# Patient Record
Sex: FEMALE | Race: WHITE | Employment: OTHER | URBAN - METROPOLITAN AREA
[De-identification: names, ages, dates, MRNs, and addresses within clinical notes are randomized per-mention and may not be internally consistent; named-entity substitution may affect disease eponyms.]

---

## 2016-11-15 LAB
CREAT SERPL-MCNC: 0.78 MG/DL (ref 0.55–1.02)
GFR SERPL CREATININE-BSD FRML MDRD: >60 ML/MIN
GLUCOSE SERPL-MCNC: 169 MG/DL (ref 74–106)
POTASSIUM SERPL-SCNC: 4.3 MMOL/L (ref 3.5–5.1)
TSH SERPL-ACNC: 1.64 UIU/ML (ref 0.36–3.74)

## 2017-03-09 LAB
CHOLEST SERPL-MCNC: 157 MG/DL
HDLC SERPL-MCNC: 44 MG/DL
LDLC SERPL CALC-MCNC: 36 MG/DL
TRIGL SERPL-MCNC: 384 MG/DL

## 2017-06-20 LAB
CREAT SERPL-MCNC: 0.69 MG/DL (ref 0.55–1.02)
GFR SERPL CREATININE-BSD FRML MDRD: >60 ML/MIN
GLUCOSE SERPL-MCNC: 169 MG/DL (ref 74–106)
POTASSIUM SERPL-SCNC: 4.1 MMOL/L (ref 3.5–5.1)
TSH SERPL-ACNC: 0.84 UIU/ML (ref 0.36–3.74)

## 2017-07-14 LAB — GLUCOSE SERPL-MCNC: 135 MG/DL (ref 60–100)

## 2017-10-12 LAB — GLUCOSE SERPL-MCNC: 193 MG/DL (ref 74–106)

## 2017-12-14 LAB — GLUCOSE SERPL-MCNC: 247 MG/DL (ref 60–100)

## 2018-03-21 ENCOUNTER — TRANSFERRED RECORDS (OUTPATIENT)
Dept: HEALTH INFORMATION MANAGEMENT | Facility: CLINIC | Age: 73
End: 2018-03-21

## 2018-03-21 LAB — HBA1C MFR BLD: 10.3 % (ref 4.5–6.2)

## 2018-03-22 LAB — TSH SERPL-ACNC: 0.91 UIU/ML (ref 0.36–3.74)

## 2018-03-23 LAB
CREAT SERPL-MCNC: 0.75 MG/DL (ref 0.55–1.02)
GFR SERPL CREATININE-BSD FRML MDRD: >60 ML/MIN
GLUCOSE SERPL-MCNC: 278 MG/DL (ref 70–110)
POTASSIUM SERPL-SCNC: 3.9 MMOL/L (ref 3.5–5.1)

## 2018-06-12 LAB
CHOLEST SERPL-MCNC: 152 MG/DL
HBA1C MFR BLD: 9.2 % (ref 0–5.7)
HDLC SERPL-MCNC: 39 MG/DL
LDLC SERPL CALC-MCNC: 89 MG/DL
TRIGL SERPL-MCNC: 419 MG/DL

## 2018-08-28 LAB — HBA1C MFR BLD: 9 % (ref 0–5.7)

## 2018-12-20 ENCOUNTER — TRANSFERRED RECORDS (OUTPATIENT)
Dept: HEALTH INFORMATION MANAGEMENT | Facility: CLINIC | Age: 73
End: 2018-12-20

## 2018-12-21 ENCOUNTER — TRANSFERRED RECORDS (OUTPATIENT)
Dept: HEALTH INFORMATION MANAGEMENT | Facility: CLINIC | Age: 73
End: 2018-12-21

## 2018-12-21 LAB
CREAT SERPL-MCNC: 0.79 MG/DL (ref 0.55–1.02)
GFR SERPL CREATININE-BSD FRML MDRD: >60 ML/MIN
GLUCOSE SERPL-MCNC: 164 MG/DL (ref 74–106)
HBA1C MFR BLD: 8.4 % (ref 0–5.7)
POTASSIUM SERPL-SCNC: 4.4 MMOL/L (ref 3.5–5.1)
TSH SERPL-ACNC: 1.69 UIU/ML (ref 0.36–3.74)

## 2019-01-11 ENCOUNTER — TRANSFERRED RECORDS (OUTPATIENT)
Dept: HEALTH INFORMATION MANAGEMENT | Facility: CLINIC | Age: 74
End: 2019-01-11

## 2019-01-14 ENCOUNTER — PRE VISIT (OUTPATIENT)
Dept: ENDOCRINOLOGY | Facility: CLINIC | Age: 74
End: 2019-01-14

## 2019-01-14 NOTE — TELEPHONE ENCOUNTER
Left message for pt to call back to clinic to go over Pre-Visit questions for upcoming appointment on 1/28/19 with Dr Sue.   Yamel Mcclendon, CMA

## 2019-01-22 ENCOUNTER — TELEPHONE (OUTPATIENT)
Dept: ENDOCRINOLOGY | Facility: CLINIC | Age: 74
End: 2019-01-22

## 2019-01-22 NOTE — TELEPHONE ENCOUNTER
PREVISIT INFORMATION                                                    Nida Deng scheduled for future visit at [facility] specialty clinics.    Patient is scheduled to see Dr Sue on 1/28/19  Reason for visit: thyroid/diabetes  Referring provider   Has patient seen previous specialist? No  Medical Records:  Pt is having [facility] fax records    REVIEW                                                      New patient packet mailed to patient: No  Medication reconciliation complete: No      No current outpatient medications on file.       Allergies: Patient has no allergy information on record.        PLAN/FOLLOW-UP NEEDED                                                      Previsit review complete.  Patient will see provider at future scheduled appointment.     Patient Reminders Given:  Please, make sure you bring an updated list of your medications.   If you are having a procedure, please, present 15 minutes early.  If you need to cancel or reschedule,please call [phone].    Yamel Mcclendon

## 2019-01-22 NOTE — TELEPHONE ENCOUNTER
[facility] Call Center    Phone Message    May a detailed message be left on voicemail: yes    Reason for Call: Other: Pt called in stating she received a voicemail from the clinic stating they needed her medical records. Writer informed pt that medical records have been received per appt notes. Pt wanted to make sure the clinic has received everything they need. Please call pt if anymore info is needed.     Action Taken: Message routed to:  Adult Clinics: Endocrinology p [id number]

## 2019-02-16 ENCOUNTER — HOSPITAL ENCOUNTER (EMERGENCY)
Dept: HOSPITAL 11 - JP.ED | Age: 74
Discharge: HOME | End: 2019-02-16
Payer: MEDICARE

## 2019-02-16 DIAGNOSIS — J32.9: Primary | ICD-10-CM

## 2019-02-16 DIAGNOSIS — E11.9: ICD-10-CM

## 2019-02-16 DIAGNOSIS — I10: ICD-10-CM

## 2019-02-16 RX ADMIN — BUDESONIDE ONE MG: 0.5 SUSPENSION RESPIRATORY (INHALATION) at 14:58

## 2019-02-19 ENCOUNTER — OFFICE VISIT (OUTPATIENT)
Dept: OTOLARYNGOLOGY | Facility: OTHER | Age: 74
End: 2019-02-19
Attending: OTOLARYNGOLOGY
Payer: MEDICARE

## 2019-02-19 DIAGNOSIS — J32.3 CHRONIC SPHENOIDAL SINUSITIS: Primary | ICD-10-CM

## 2019-02-19 PROCEDURE — G0463 HOSPITAL OUTPT CLINIC VISIT: HCPCS

## 2019-02-21 NOTE — PROGRESS NOTES
ROB DUKESY    73 Y old Female, : 1945    Account Number: [id number]    [address] [place] [id number]    Home: [phone]     Guarantor: ROB DUKES Insurance: [facility] MEDICARE B Payer ID: [id number]      Appointment Facility: [facility]      2019 Progress Notes: Theron Morris MD       Reason for Appointment   1. SINUS/SCANS AT [facility]/PATIENT IS CALLING TO HAVE THEM SENT   2. Vertex headache   History of Present Illness   HPI:   The patient is a 73-year-old female who comes to the office today with a primary complaint of splitting headache at the vertex of her head. This is been present 2 variable degrees for several months. She had CT scans obtained at [facility] of her head and then specifically of her sinuses last  and then again in January that demonstrate bilateral sphenoid sinusitis with clear remaining sinuses. This is the impression from the report. The films are not available for my review. She has been on multiple antibiotic courses. She has been on a nasal steroid spray and nasal saline irrigations. She continues to have significant lingering symptoms.   Examination   General Examination:  Nasal-her septum is midline. There is no polyps or purulence visible today. She does have swelling of her nasal membranes.   Neck-no masses or adenopathy   Oral cavity oropharynx-free of lesions or inflammation   Head neck integument-Clear   General-the patient appears well and in no distress   Neuro-there are no focal cranial nerve deficits.     Assessments   1. Chronic sphenoidal sinusitis - J32.3 (Primary)   Treatment   1. Others   Notes: The patient and her granddaughter were counseled that isolated sphenoid sinusitis should be addressed surgically. We will make arrangements for endoscopic drainage at their earliest convenience. We will obtain her CT scans from the [facility].  Procedures  [ ].                      Electronically signed by  THERON MORRIS MD on 2019 at 07:53 AM CST   Sign off status: Completed          [facility]  [address] [place] [address]  Tel: [phone]  Fax:           Patient: ROB DUKES : 1945 Progress Note: Theron Morris MD 2019      Note generated by [employer] EMR/PM Software ([email or web address])

## 2019-05-16 ENCOUNTER — HOSPITAL ENCOUNTER (OUTPATIENT)
Dept: HOSPITAL 11 - JP.MRI | Age: 74
Discharge: HOME | End: 2019-05-16
Attending: FAMILY MEDICINE
Payer: MEDICARE

## 2019-05-16 DIAGNOSIS — R42: Primary | ICD-10-CM

## 2019-05-16 DIAGNOSIS — J32.0: ICD-10-CM

## 2019-05-16 PROCEDURE — A9576 INJ PROHANCE MULTIPACK: HCPCS

## 2019-05-16 PROCEDURE — A9579 GAD-BASE MR CONTRAST NOS,1ML: HCPCS

## 2019-05-16 PROCEDURE — 70553 MRI BRAIN STEM W/O & W/DYE: CPT

## 2019-05-16 RX ADMIN — GADOTERIDOL SCH ML: 279.3 INJECTION, SOLUTION INTRAVENOUS at 14:42

## 2019-08-01 ENCOUNTER — TELEPHONE (OUTPATIENT)
Dept: ENDOCRINOLOGY | Facility: CLINIC | Age: 74
End: 2019-08-01

## 2019-08-01 NOTE — TELEPHONE ENCOUNTER
[facility] Call Center    Phone Message    May a detailed message be left on voicemail: yes    Reason for Call: Other: Pt is requesting a call back from care team to discuss a timeline of appts. She missed her previously scheduled appt.     She states she is traveling from about 3 hours away and is requesting a call back to discuss how many times she'll have to come for appts. Writer advised her since she has not been seen it would be hard to answer. Please advise.     Action Taken: Message routed to:  Adult Clinics: Endocrinology p [id number]

## 2019-08-01 NOTE — TELEPHONE ENCOUNTER
Patient would like to be placed on list to see Dr. Christal Jin when schedule is available for Hashimotos consult. She will schedule at the [facility] to establish care with a primary doctor.    Qian Alexander LPN  Diabetes Clinic Coordinator   Adult Endocrinology and Pediatric Specialty Clinics  [facility]

## 2019-08-01 NOTE — TELEPHONE ENCOUNTER
Appears patient was scheduled to see Endocrine back in January for thyroid/diabetes consult.     Attempted to contact patient. Unable to leave voicemail as call was disconnected.    Qian Alexander LPN  Diabetes Clinic Coordinator   Adult Endocrinology and Pediatric Specialty Clinics  [facility]

## 2019-08-22 ENCOUNTER — TELEPHONE (OUTPATIENT)
Dept: ENDOCRINOLOGY | Facility: CLINIC | Age: 74
End: 2019-08-22

## 2019-08-22 NOTE — TELEPHONE ENCOUNTER
Patient informed Dr. Jin's schedule is still not available. Reassured patient that she is on wait list and will be contacted as soon as schedule opens.    Qian Alexander LPN  Diabetes Clinic Coordinator   Adult Endocrinology and Pediatric Specialty Clinics  [facility]

## 2019-08-22 NOTE — TELEPHONE ENCOUNTER
[facility] Call Center    Phone Message    May a detailed message be left on voicemail: no    Reason for Call: Other: Pt had appt in Feb with Dr Sue but missed the appt.  Qian in endo talked to pt and offered new endo physician that was starting in Sept 2019 and she was going to call when schedule available.  Pt is just checking in and would like to be put on that schedule asap.  Please advise.       Pt does not have good service where she is and is asking to leave a detailed message if possible.     Action Taken: Message routed to:  Adult Clinics: Endocrinology p [id number]

## 2019-09-17 NOTE — TELEPHONE ENCOUNTER
Patient scheduled for 9/23/19 with Dr. Parker.    Renetta Thompson, [facility]  Adult Endocrinology  [facility]

## 2019-09-23 ENCOUNTER — OFFICE VISIT (OUTPATIENT)
Dept: ENDOCRINOLOGY | Facility: CLINIC | Age: 74
End: 2019-09-23
Payer: MEDICARE

## 2019-09-23 VITALS
WEIGHT: 161.6 LBS | OXYGEN SATURATION: 92 % | HEART RATE: 97 BPM | DIASTOLIC BLOOD PRESSURE: 85 MMHG | SYSTOLIC BLOOD PRESSURE: 130 MMHG

## 2019-09-23 DIAGNOSIS — E06.3 HASHIMOTO'S THYROIDITIS: Primary | ICD-10-CM

## 2019-09-23 DIAGNOSIS — E11.65 TYPE 2 DIABETES MELLITUS WITH HYPERGLYCEMIA, UNSPECIFIED WHETHER LONG TERM INSULIN USE (H): ICD-10-CM

## 2019-09-23 LAB — HBA1C MFR BLD: 9.4 % (ref 0–5.6)

## 2019-09-23 PROCEDURE — 99203 OFFICE O/P NEW LOW 30 MIN: CPT | Performed by: INTERNAL MEDICINE

## 2019-09-23 PROCEDURE — 36415 COLL VENOUS BLD VENIPUNCTURE: CPT | Performed by: INTERNAL MEDICINE

## 2019-09-23 PROCEDURE — 83036 HEMOGLOBIN GLYCOSYLATED A1C: CPT | Performed by: INTERNAL MEDICINE

## 2019-09-23 RX ORDER — LISINOPRIL 10 MG/1
10 TABLET ORAL DAILY
COMMUNITY
Start: 2016-12-02

## 2019-09-23 RX ORDER — LEVOTHYROXINE SODIUM 112 UG/1
1 CAPSULE ORAL DAILY
COMMUNITY
End: 2019-10-03 | Stop reason: DRUGHIGH

## 2019-09-23 RX ORDER — ATORVASTATIN CALCIUM 40 MG/1
1 TABLET, FILM COATED ORAL DAILY
COMMUNITY
Start: 2018-11-27

## 2019-09-23 RX ORDER — FLURBIPROFEN SODIUM 0.3 MG/ML
1 SOLUTION/ DROPS OPHTHALMIC DAILY
Refills: 11 | COMMUNITY
Start: 2019-07-08

## 2019-09-23 RX ORDER — METOPROLOL TARTRATE 25 MG/1
12.5 TABLET, FILM COATED ORAL 2 TIMES DAILY
COMMUNITY
Start: 2018-06-28

## 2019-09-23 RX ORDER — INSULIN GLARGINE 100 [IU]/ML
6 INJECTION, SOLUTION SUBCUTANEOUS DAILY
Refills: 3 | COMMUNITY
Start: 2019-08-06

## 2019-09-23 RX ORDER — BUDESONIDE 1 MG/2ML
1 INHALANT ORAL DAILY
Refills: 3 | COMMUNITY
Start: 2019-09-18

## 2019-09-23 RX ORDER — TRAZODONE HYDROCHLORIDE 150 MG/1
50 TABLET ORAL DAILY
Refills: 3 | COMMUNITY
Start: 2019-06-25

## 2019-09-23 RX ORDER — LEVALBUTEROL TARTRATE 45 UG/1
1-2 AEROSOL, METERED ORAL EVERY 4 HOURS PRN
COMMUNITY
Start: 2016-12-02

## 2019-09-23 SDOH — HEALTH STABILITY: MENTAL HEALTH: HOW OFTEN DO YOU HAVE A DRINK CONTAINING ALCOHOL?: NEVER

## 2019-09-23 NOTE — NURSING NOTE
Nida Deng's goals for this visit include:   Chief Complaint   Patient presents with     Consult     Thyroid Problem     Diabetes     She requests these members of her care team be copied on today's visit information: No    PCP: No Ref-Primary, Physician    Referring Provider:  No referring provider defined for this encounter.    /85 (BP Location: Left arm, Patient Position: Sitting, Cuff Size: Adult Regular)   Pulse 97   Wt 73.3 kg (161 lb 9.6 oz)   SpO2 92%     Do you need any medication refills at today's visit? No

## 2019-09-23 NOTE — LETTER
Date:September 26, 2019      Patient was self referred, no letter generated. Do not send.        [facility] Health Information

## 2019-09-23 NOTE — PROGRESS NOTES
Endocrinology Note         Nida is a 74 year old female presents today for uncontrolled type 2 diabetes, Hashimoto's thyroiditis    HPI  Nida is a 74 year old female presents today for uncontrolled type 2 diabetes and Hashimoto thyroiditis    She started the conversation that she would like to get the alternative medication for thyroid medication,  diabetes medication, lisinopril, statin. She stated that the current levothyroxine interact with as of her medication and causing the symptoms. She is rambling and does not have not coherence in the her conversation. She could not specify what symptoms she referred to.  She stated that she had had hypothyroidism since she was 51 and has been on thyroid replacement since.  Currently, she is taking an bicycler to have her body.  112 mcg daily. TFT in 12/2018 was normal. Previous TFTs in the past 5 years were in good range.      Regarding to type 2 diabetes, she is currently taking Basaglar 6 units daily.  Stated she could not tolerate a higher dose she is not feeling well.  She could not tell me symptoms were.  Said that she was on glimepiride, glipizide but they did not work.  It looked like that she was previously on glipizide 20 mg a day.  She was on metformin but could not tolerate it due to palpitation.  A1c today is 9.4%. she checked fingerstick glucose intermittently, average glucose 201 (SD 26)  Stated that she had been reading a lot medications and found out that they are not doing good for your body.  She has had fixed idea that medications that she is taking do harm for her body. She would like to get alternatives.     Past Medical History  Type 2 diabetes  Hypertension  Hyperlipidemia  Hypothyroidism    Allergies  Allergies not on file     Medications  Current Outpatient Medications   Medication Sig Dispense Refill     atorvastatin (LIPITOR) 40 MG tablet Take 1 tablet by mouth daily       B-D U/F insulin pen needle 1 each daily  11     blood glucose  (CONTOUR NEXT TEST) test strip 1 each by Other route daily       budesonide (PULMICORT) 1 MG/2ML neb solution 1 ampule daily  3     empagliflozin (JARDIANCE) 10 MG TABS tablet Take 1 tablet (10 mg) by mouth daily 90 tablet 3     insulin glargine (BASAGLAR KWIKPEN) 100 UNIT/ML pen Inject 6 Units Subcutaneous daily  3     levalbuterol (XOPENEX HFA) 45 MCG/ACT inhaler Inhale 1-2 puffs into the lungs every 4 hours as needed       levothyroxine (TIROSINT) 112 MCG capsule Take 1 tablet by mouth daily       lisinopril (PRINIVIL/ZESTRIL) 10 MG tablet Take 10 mg by mouth daily       metoprolol tartrate (LOPRESSOR) 25 MG tablet Take 12.5 mg by mouth 2 times daily       traZODone (DESYREL) 150 MG tablet Take 50 mg by mouth daily  3     Family History  Non contributory     Social History  Social History     Tobacco Use     Smoking status: Former Smoker     Packs/day: 0.00     Last attempt to quit: 2014     Years since quittin.7     Smokeless tobacco: Never Used   Substance Use Topics     Alcohol use: Never     Frequency: Never     Drug use: None   lives with her daughter    ROS  Constitutional: no weight change, low energy  Eyes: no vision change, diplopia or red eyes   Neck: no difficulty swallowing, no choking, no neck pain, no neck swelling  Cardiovascular: no chest pain, palpitations  Respiratory: no dyspnea, cough, shortness of breath or wheezing   GI: no nausea, vomiting, diarrhea or constipation, no abdominal pain   : no change in urine, no dysuria or hematuria  Musculoskeletal: no joint or muscle pain or swelling   Integumentary: +rosacea   Neuro: no loss of strength or sensation, no numbness or tingling, no tremor, no dizziness, no headache   Endo: no polyuria or polydipsia, no temperature intolerance   Heme/Lymph: no concerning bumps, no bleeding problems   Allergy: no environmental allergies   Psych: no sleep problems.    Physical Exam  /85 (BP Location: Left arm, Patient Position: Sitting, Cuff Size:  Adult Regular)   Pulse 97   Wt 73.3 kg (161 lb 9.6 oz)   SpO2 92%   There is no height or weight on file to calculate BMI.  Constitutional: no distress, comfortable, incoherent  Eyes: anicteric, normal extra-ocular movements, no lid lag or retraction  Musculoskeletal: no edema   Skin: no jaundice   Neurological: cranial nerves intact, normal gait, no tremor on outstretched hands bilaterally  Psychological: appropriate mood       RESULTS  I have personally reviewed labs and images. I also reviewed labs with patient and discussed the result and plan of care.  Lab Results   Component Value Date    A1C 9.4 09/23/2019    A1C 8.4 12/21/2018    A1C 9.0 08/28/2018    A1C 9.2 06/12/2018    A1C 10.3 03/21/2018             ASSESSMENT:    Nida is a 74 year old female presents today for uncontrolled type 2 diabetes and Hashimoto thyroiditis    1) uncontrolled type 2 diabetes: A1c is 9.4% today. Her glycemic control has been suboptimal for a long time. She reports that she could not tolerate metformin due to palpitation and stated that glipizide and glimepiride did not work. Currently, on Basaglar 6 units and could not tolerate higher dose due to not feeling well. I have discussed to her that her non-specific symptoms are likely due to uncontrolled diabetes. She is not willing to increase insulin and would like to change to different type. After discussion, I would try SGLT-1 inhibitor -- Jardiance 10 mg daily first.     2) Hashimoto's thyroiditis: long standing hypothyroidism. Her symptoms are not related to thyroid condition. Will check lab today. May consider trying brand Synthroid.    PLAN:   - start Jardiance 10 mg daily   - continue Basaglar 6 units daily  - check TFT today -- consider switch from generic to brand Synthroid 112 mcg daily.  - recommend her to establish care with PCP  - return 4-6 weeks to meet with [facility]  - return 3 months    Keith Palomares MD  Division of Diabetes and  Endocrinology  Department of Medicine  [phone]

## 2019-09-23 NOTE — LETTER
9/23/2019         RE: Nida Deng  [address]  [place] [address]        Dear Colleague,    Thank you for referring your patient, Nida Deng, to the [facility]. Please see a copy of my visit note below.         Endocrinology Note         Nida is a 74 year old female presents today for uncontrolled type 2 diabetes, Hashimoto's thyroiditis    HPI  Nida is a 74 year old female presents today for uncontrolled type 2 diabetes and Hashimoto thyroiditis    She started the conversation that she would like to get the alternative medication for thyroid medication,  diabetes medication, lisinopril, statin. She stated that the current levothyroxine interact with as of her medication and causing the symptoms. She is rambling and does not have not coherence in the her conversation. She could not specify what symptoms she referred to.  She stated that she had had hypothyroidism since she was 51 and has been on thyroid replacement since.  Currently, she is taking an bicycler to have her body.  112 mcg daily. TFT in 12/2018 was normal. Previous TFTs in the past 5 years were in good range.      Regarding to type 2 diabetes, she is currently taking Basaglar 6 units daily.  Stated she could not tolerate a higher dose she is not feeling well.  She could not tell me symptoms were.  Said that she was on glimepiride, glipizide but they did not work.  It looked like that she was previously on glipizide 20 mg a day.  She was on metformin but could not tolerate it due to palpitation.  A1c today is 9.4%. she checked fingerstick glucose intermittently, average glucose 201 (SD 26)  Stated that she had been reading a lot medications and found out that they are not doing good for your body.  She has had fixed idea that medications that she is taking do harm for her body. She would like to get alternatives.     Past Medical History  Type 2  diabetes  Hypertension  Hyperlipidemia  Hypothyroidism    Allergies  Allergies not on file     Medications  Current Outpatient Medications   Medication Sig Dispense Refill     atorvastatin (LIPITOR) 40 MG tablet Take 1 tablet by mouth daily       B-D U/F insulin pen needle 1 each daily  11     blood glucose (CONTOUR NEXT TEST) test strip 1 each by Other route daily       budesonide (PULMICORT) 1 MG/2ML neb solution 1 ampule daily  3     empagliflozin (JARDIANCE) 10 MG TABS tablet Take 1 tablet (10 mg) by mouth daily 90 tablet 3     insulin glargine (BASAGLAR KWIKPEN) 100 UNIT/ML pen Inject 6 Units Subcutaneous daily  3     levalbuterol (XOPENEX HFA) 45 MCG/ACT inhaler Inhale 1-2 puffs into the lungs every 4 hours as needed       levothyroxine (TIROSINT) 112 MCG capsule Take 1 tablet by mouth daily       lisinopril (PRINIVIL/ZESTRIL) 10 MG tablet Take 10 mg by mouth daily       metoprolol tartrate (LOPRESSOR) 25 MG tablet Take 12.5 mg by mouth 2 times daily       traZODone (DESYREL) 150 MG tablet Take 50 mg by mouth daily  3     Family History  Non contributory     Social History  Social History     Tobacco Use     Smoking status: Former Smoker     Packs/day: 0.00     Last attempt to quit: 2014     Years since quittin.7     Smokeless tobacco: Never Used   Substance Use Topics     Alcohol use: Never     Frequency: Never     Drug use: None   lives with her daughter    ROS  Constitutional: no weight change, low energy  Eyes: no vision change, diplopia or red eyes   Neck: no difficulty swallowing, no choking, no neck pain, no neck swelling  Cardiovascular: no chest pain, palpitations  Respiratory: no dyspnea, cough, shortness of breath or wheezing   GI: no nausea, vomiting, diarrhea or constipation, no abdominal pain   : no change in urine, no dysuria or hematuria  Musculoskeletal: no joint or muscle pain or swelling   Integumentary: +rosacea   Neuro: no loss of strength or sensation, no numbness or tingling,  no tremor, no dizziness, no headache   Endo: no polyuria or polydipsia, no temperature intolerance   Heme/Lymph: no concerning bumps, no bleeding problems   Allergy: no environmental allergies   Psych: no sleep problems.    Physical Exam  /85 (BP Location: Left arm, Patient Position: Sitting, Cuff Size: Adult Regular)   Pulse 97   Wt 73.3 kg (161 lb 9.6 oz)   SpO2 92%   There is no height or weight on file to calculate BMI.  Constitutional: no distress, comfortable, incoherent  Eyes: anicteric, normal extra-ocular movements, no lid lag or retraction  Musculoskeletal: no edema   Skin: no jaundice   Neurological: cranial nerves intact, normal gait, no tremor on outstretched hands bilaterally  Psychological: appropriate mood       RESULTS  I have personally reviewed labs and images. I also reviewed labs with patient and discussed the result and plan of care.  Lab Results   Component Value Date    A1C 9.4 09/23/2019    A1C 8.4 12/21/2018    A1C 9.0 08/28/2018    A1C 9.2 06/12/2018    A1C 10.3 03/21/2018             ASSESSMENT:    Nida is a 74 year old female presents today for uncontrolled type 2 diabetes and Hashimoto thyroiditis    1) uncontrolled type 2 diabetes: A1c is 9.4% today. Her glycemic control has been suboptimal for a long time. She reports that she could not tolerate metformin due to palpitation and stated that glipizide and glimepiride did not work. Currently, on Basaglar 6 units and could not tolerate higher dose due to not feeling well. I have discussed to her that her non-specific symptoms are likely due to uncontrolled diabetes. She is not willing to increase insulin and would like to change to different type. After discussion, I would try SGLT-1 inhibitor -- Jardiance 10 mg daily first.     2) Hashimoto's thyroiditis: long standing hypothyroidism. Her symptoms are not related to thyroid condition. Will check lab today. May consider trying brand Synthroid.    PLAN:   - start Jardiance 10  mg daily   - continue Basaglar 6 units daily  - check TFT today -- consider switch from generic to brand Synthroid 112 mcg daily.  - recommend her to establish care with PCP  - return 4-6 weeks to meet with [facility]  - return 3 months    Keith Palomares MD  Division of Diabetes and Endocrinology  Department of Medicine  [phone]      Again, thank you for allowing me to participate in the care of your patient.        Sincerely,        Keith Palomares MD

## 2019-09-24 ENCOUNTER — TELEPHONE (OUTPATIENT)
Dept: ENDOCRINOLOGY | Facility: CLINIC | Age: 74
End: 2019-09-24

## 2019-09-24 NOTE — TELEPHONE ENCOUNTER
Patient had consult with Dr. Palomares yesterday.     Follow-up plan per progress note patient instructions was as follows:    - start Jardiance 10 mg daily  - once you start Jardiance, you can try stopping insulin  - drink more water  - you should establish care with primary doctor  - return to see Makenzie Caputo in 6 weeks with lab in 6 weeks  - return to clinic in 3 months             Contacted patient to review. Patient reports that when she was diagnosed with her thyroid condition when she was 51 years old, her doctor told her that she had an autoimmune disease. Patient wanted to let Dr. Palomares know. Discussed with patient Dr. Palomares aware of hashimoto's thyroiditis. Patient also notes that she forgot to have her labs completed after her office visit. Patient is coming on 9/30/19 to establish care with a primary care provider. She will do her labs for Dr. Palomares at that time.    Will update Dr. Palomares.       Marita Dominguez RN  Endocrine Care Coordinator  [facility]

## 2019-09-24 NOTE — TELEPHONE ENCOUNTER
[facility] Call Center    Phone Message    May a detailed message be left on voicemail: yes    Reason for Call: Other: Patient wants to discuss labs, and has questions about her auto immune deficiency. Please Advise     Action Taken: Message routed to:  Adult Clinics: Endocrinology p [id number]

## 2019-09-25 PROBLEM — E11.65 TYPE 2 DIABETES MELLITUS WITH HYPERGLYCEMIA (H): Status: ACTIVE | Noted: 2019-09-25

## 2019-09-30 DIAGNOSIS — E06.3 HASHIMOTO'S THYROIDITIS: ICD-10-CM

## 2019-09-30 LAB
T4 FREE SERPL-MCNC: 1.12 NG/DL (ref 0.76–1.46)
TSH SERPL DL<=0.005 MIU/L-ACNC: 0.23 MU/L (ref 0.4–4)

## 2019-09-30 PROCEDURE — 36415 COLL VENOUS BLD VENIPUNCTURE: CPT | Performed by: INTERNAL MEDICINE

## 2019-09-30 PROCEDURE — 84443 ASSAY THYROID STIM HORMONE: CPT | Performed by: INTERNAL MEDICINE

## 2019-09-30 PROCEDURE — 84439 ASSAY OF FREE THYROXINE: CPT | Performed by: INTERNAL MEDICINE

## 2019-10-03 ENCOUNTER — TELEPHONE (OUTPATIENT)
Dept: ENDOCRINOLOGY | Facility: CLINIC | Age: 74
End: 2019-10-03

## 2019-10-03 DIAGNOSIS — E11.65 TYPE 2 DIABETES MELLITUS WITH HYPERGLYCEMIA, UNSPECIFIED WHETHER LONG TERM INSULIN USE (H): Primary | ICD-10-CM

## 2019-10-03 RX ORDER — LEVOTHYROXINE SODIUM 100 UG/1
100 CAPSULE ORAL DAILY
Qty: 90 CAPSULE | Refills: 1 | Status: SHIPPED | OUTPATIENT
Start: 2019-10-03 | End: 2020-03-26

## 2019-10-03 NOTE — TELEPHONE ENCOUNTER
Per Dr. Palomares Result Note Message:  Hello,   Based thyroid lab, the current dose is slightly too much. I would like to reduce Tirosint to 100 mcg daily and repeat lab at next visit.   Is she able to get Jardiance?     Thanks,   Keith     Updated prescription for Tirosint (pharmacy filling tablet form) completed to pharmacy.     Patient is at pharmacy and picking up Jardiance=$8.00.    Will forward update to Dr. Keith Palomares.    Qian Alexander LPN  Diabetes Clinic Coordinator   Adult Endocrinology and Pediatric Specialty Clinics  [facility]

## 2020-03-26 DIAGNOSIS — E11.65 TYPE 2 DIABETES MELLITUS WITH HYPERGLYCEMIA, UNSPECIFIED WHETHER LONG TERM INSULIN USE (H): ICD-10-CM

## 2020-03-26 RX ORDER — LEVOTHYROXINE SODIUM 100 UG/1
100 TABLET ORAL DAILY
Qty: 90 TABLET | Refills: 1 | Status: SHIPPED | OUTPATIENT
Start: 2020-03-26

## 2020-03-26 NOTE — TELEPHONE ENCOUNTER
Last Clinic Visit: 9-23-19  Notes recorded by Keith Palomares MD on 10/1/2019 at 5:00 PM CDT   Hello,     Based thyroid lab, the current dose is slightly too much. I would like to reduce Tirosint to 100 mcg daily and repeat lab at next visit.

## 2020-09-30 DIAGNOSIS — E11.65 TYPE 2 DIABETES MELLITUS WITH HYPERGLYCEMIA, UNSPECIFIED WHETHER LONG TERM INSULIN USE (H): ICD-10-CM

## 2020-10-04 NOTE — TELEPHONE ENCOUNTER
levothyroxine (SYNTHROID/LEVOTHROID) 100 MCG tablet   Last Written Prescription Date:  3/26/20  Last Fill Quantity: 90,   # refills: 1  Last Office Visit : 9/23/19 MG Tasma  Future Office visit: none    Scheduling has been notified to contact the pt for appointment.      Routing refill request to provider for review/approval because:  tsh past due , abnormal

## 2020-10-05 RX ORDER — LEVOTHYROXINE SODIUM 100 UG/1
100 TABLET ORAL DAILY
Qty: 90 TABLET | OUTPATIENT
Start: 2020-10-05

## 2020-10-05 NOTE — TELEPHONE ENCOUNTER
I spoke with Nida and she states that she is no longer following with our practice and would like refills forwarded to her PCP Dr Mendoza at [facility] in [place].     Michel Heredia  Procedure , [facility] Specialty and Adult Endocrinology